# Patient Record
Sex: FEMALE | Race: BLACK OR AFRICAN AMERICAN | NOT HISPANIC OR LATINO | URBAN - METROPOLITAN AREA
[De-identification: names, ages, dates, MRNs, and addresses within clinical notes are randomized per-mention and may not be internally consistent; named-entity substitution may affect disease eponyms.]

---
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
## 2025-04-22 NOTE — LACTATION INITIAL EVALUATION - LACTATION INTERVENTIONS
Patient reviewed message with recommendations  Verbalized understanding  Will do labs today    Encounter closed   initiate hand expression routine/initiate skin to skin